# Patient Record
Sex: FEMALE | Race: WHITE | Employment: OTHER | URBAN - METROPOLITAN AREA
[De-identification: names, ages, dates, MRNs, and addresses within clinical notes are randomized per-mention and may not be internally consistent; named-entity substitution may affect disease eponyms.]

---

## 2017-06-21 ENCOUNTER — TELEPHONE (OUTPATIENT)
Dept: OBGYN CLINIC | Age: 72
End: 2017-06-21

## 2017-06-21 NOTE — TELEPHONE ENCOUNTER
Call received at 10:49am      73year old patient last seen in the office on 6/13/16 and has appointment on 7/7/17 for AE and mammogram. Patient calling about getting her bone density results from 5/18/15 to be sent to her PCP. This nurse advised patient to contact her PCP and have them sent a fax request to our office and have it faxed to our office at . Patient verbalized understanding.

## 2017-06-27 ENCOUNTER — HOSPITAL ENCOUNTER (OUTPATIENT)
Dept: ULTRASOUND IMAGING | Age: 72
Discharge: HOME | End: 2017-06-27
Attending: FAMILY MEDICINE
Payer: MEDICARE

## 2017-06-27 DIAGNOSIS — K76.89 LIVER CYST: ICD-10-CM

## 2017-06-27 PROCEDURE — 76700 US EXAM ABDOM COMPLETE: CPT

## 2017-09-01 ENCOUNTER — OFFICE VISIT (OUTPATIENT)
Dept: OBGYN CLINIC | Age: 72
End: 2017-09-01

## 2017-09-01 VITALS
SYSTOLIC BLOOD PRESSURE: 110 MMHG | WEIGHT: 121 LBS | DIASTOLIC BLOOD PRESSURE: 72 MMHG | HEIGHT: 62 IN | BODY MASS INDEX: 22.26 KG/M2

## 2017-09-01 DIAGNOSIS — Z01.419 ENCOUNTER FOR GYNECOLOGICAL EXAMINATION WITHOUT ABNORMAL FINDING: Primary | ICD-10-CM

## 2017-09-01 NOTE — PROGRESS NOTES
Annual exam ages 69+    Dalton is a ,  81 y.o. female [facility] No LMP recorded. Patient is postmenopausal.  She presents for her annual checkup. She is having no significant problems. With regard to the Gardasil vaccine, she is older than the age for which it is FDA approved. Menstrual status:    Her periods are nonexistent in flow due to being postmenopausal.     She denies dysmenorrhea. Hormonal status:  She reports no perimenstrual type symptoms. She is not having vasomotor symptoms. The patient is not using any ERT. Sexual history:    She  reports that she does not currently engage in sexual activity but has had male partners.; She reports using the following method of birth control/protection: None. Medical conditions:    Since her last annual GYN exam about one year ago, she has not the following changes in her health history: none. Pap and Mammogram History:    Her most recent Pap smear was normal obtained 2 year(s) ago. The patient had her mammogram today in our office. Breast Cancer History/Substance Abuse: positive breast cancer in 2 maternal aunts    Osteoporosis History:    Family history does not include a first or second degree relative with osteopenia or osteoporosis. A bone density scan was obtained 2 years ago and revealed Osteopenia of the hip. She is currently taking calcium and vit D. Past Medical History:   Diagnosis Date    Atrophy of vagina     Atrophy of vulva     Common migraine     Encounter for IUD removal 04    Copper 7 Removed    Family history of skin cancer     Fatigue 2012    Headache     Hx of bone density study 07/20/15    Osteopenia of the hip with normal spine.     Hx-TIA (transient ischemic attack)     Hypercholesterolemia     Menopausal syndrome     Osteopenia     Seizure disorder (HCC)     Skin cancer     Skipped beats 2012    Sun-damaged skin      Past Surgical History: Procedure Laterality Date    COLONOSCOPY N/A 6/24/2016    COLONOSCOPY performed by Ernst Grady MD at [facility] HX CATARACT REMOVAL Right     right eye    HX CYST REMOVAL      HX HERNIA REPAIR      \"born with it\"    HX ORTHOPAEDIC Left     Leg Surgery--left leg fracture    NEUROLOGICAL PROCEDURE UNLISTED      cyst on spine removed       Current Outpatient Prescriptions   Medication Sig Dispense Refill    cholecalciferol, VITAMIN D3, (VITAMIN D3) 5,000 unit tab tablet Take  by mouth daily.  folic acid (FOLVITE) 1 mg tablet       DOCOSAHEXANOIC ACID/EPA (FISH OIL PO) Take  by mouth.  MULTIVITAMIN PO Take  by mouth.  topiramate (TOPAMAX) 50 mg tablet Take 1 Tab by mouth two (2) times a day. Brand Name Medically Necessary 180 Tab 3    PRASTERONE, DHEA, (DHEA PO) Take 5 mg by mouth.  ergocalciferol (DRISDOL) 50,000 unit capsule 50,000 units vit d twice a week for 6 weeks. 12 Cap 0     Allergies: Bactrim [sulfamethoprim] and Sulfa (sulfonamide antibiotics)     Tobacco History:  reports that she has never smoked. She has never used smokeless tobacco.  Alcohol Abuse:  reports that she does not drink alcohol. Drug Abuse:  reports that she does not use illicit drugs.     Family Medical/Cancer History:   Family History   Problem Relation Age of Onset    Heart Disease Mother     Cancer Mother      Lung    Breast Cancer Maternal Aunt      x2    Heart Failure Father 64        Review of Systems - History obtained from the patient  Constitutional: negative for weight loss, fever, night sweats  HEENT: negative for hearing loss, earache, congestion, snoring, sorethroat  CV: negative for chest pain, palpitations, edema  Resp: negative for cough, shortness of breath, wheezing  GI: negative for change in bowel habits, abdominal pain, black or bloody stools  : negative for frequency, dysuria, hematuria, vaginal discharge  MSK: negative for back pain, joint pain, muscle pain  Breast: negative for breast lumps, nipple discharge, galactorrhea  Skin :negative for itching, rash, hives  Neuro: negative for dizziness, headache, confusion, weakness  Psych: negative for anxiety, depression, change in mood  Heme/lymph: negative for bleeding, bruising, pallor    Physical Exam    Visit Vitals    /72    Ht 5' 2\" (1.575 m)    Wt 121 lb (54.9 kg)    BMI 22.13 kg/m2       Constitutional  · Appearance: well-nourished, well developed, alert, in no acute distress    HENT  · Head and Face: appears normal    Neck  · Inspection/Palpation: normal appearance, no masses or tenderness  · Lymph Nodes: no lymphadenopathy present  · Thyroid: gland size normal, nontender, no nodules or masses present on palpation    Chest  · Respiratory Effort: breathing unlabored  · Auscultation: normal breath sounds    Cardiovascular  · Heart:  · Auscultation: regular rate and rhythm without murmur    Breasts  · Inspection of Breasts: breasts symmetrical, no skin changes, no discharge present, nipple appearance normal, no skin retraction present  · Palpation of Breasts and Axillae: no masses present on palpation, no breast tenderness  · Axillary Lymph Nodes: no lymphadenopathy present    Gastrointestinal  · Abdominal Examination: abdomen non-tender to palpation, normal bowel sounds, no masses present  · Liver and spleen: no hepatomegaly present, spleen not palpable  · Hernias: no hernias identified    Genitourinary  · External Genitalia: normal appearance for age, no discharge present, no tenderness present, no inflammatory lesions present, no masses present, atrophy present  · Vagina: atrophic but otherwise normal vaginal vault without central or paravaginal defects, no discharge present, no inflammatory lesions present, no masses present  · Bladder: non-tender to palpation  · Urethra: appears normal  · Cervix: normal   · Uterus: normal size, shape and consistency  · Adnexa: no adnexal tenderness present, no adnexal masses present  · Perineum: perineum within normal limits, no evidence of trauma, no rashes or skin lesions present  · Anus: anus within normal limits, no hemorrhoids present  · Inguinal Lymph Nodes: no lymphadenopathy present    Skin  · General Inspection: no rash, no lesions identified    Neurologic/Psychiatric  · Mental Status:  · Orientation: grossly oriented to person, place and time  · Mood and Affect: mood normal, affect appropriate    Assessment:  Routine gynecologic examination  Her current medical status is satisfactory with no evidence of significant gynecologic issues.     Plan:  Counseled re: diet, exercise, healthy lifestyle  Return for yearly wellness visits  Rec annual mammogram

## 2021-03-01 ENCOUNTER — TRANSCRIBE ORDER (OUTPATIENT)
Dept: SCHEDULING | Age: 76
End: 2021-03-01

## 2021-03-01 DIAGNOSIS — M54.2 CERVICALGIA: Primary | ICD-10-CM

## 2021-03-01 DIAGNOSIS — M48.02 SPINAL STENOSIS IN CERVICAL REGION: ICD-10-CM

## 2021-04-12 ENCOUNTER — HOSPITAL ENCOUNTER (OUTPATIENT)
Dept: MRI IMAGING | Age: 76
Discharge: HOME | End: 2021-04-12
Attending: FAMILY MEDICINE
Payer: MEDICARE

## 2021-04-12 DIAGNOSIS — M54.2 CERVICALGIA: ICD-10-CM

## 2021-04-12 PROCEDURE — 72141 MRI NECK SPINE W/O DYE: CPT

## 2021-09-27 ENCOUNTER — TRANSCRIBE ORDER (OUTPATIENT)
Dept: SCHEDULING | Age: 76
End: 2021-09-27

## 2021-09-27 DIAGNOSIS — K76.89 LIVER CYST: Primary | ICD-10-CM

## 2021-09-27 DIAGNOSIS — M48.02 CERVICAL SPINAL STENOSIS: ICD-10-CM

## 2021-09-27 DIAGNOSIS — M50.20 DISPLACEMENT OF CERVICAL INTERVERTEBRAL DISC: ICD-10-CM

## 2021-11-26 ENCOUNTER — HOSPITAL ENCOUNTER (OUTPATIENT)
Dept: MRI IMAGING | Age: 76
Discharge: HOME | End: 2021-11-26
Attending: FAMILY MEDICINE
Payer: MEDICARE

## 2021-11-26 ENCOUNTER — HOSPITAL ENCOUNTER (OUTPATIENT)
Dept: ULTRASOUND IMAGING | Age: 76
Discharge: HOME | End: 2021-11-26
Attending: FAMILY MEDICINE
Payer: MEDICARE

## 2021-11-26 DIAGNOSIS — K76.89 LIVER CYST: ICD-10-CM

## 2021-11-26 DIAGNOSIS — M50.20 DISPLACEMENT OF CERVICAL INTERVERTEBRAL DISC: ICD-10-CM

## 2021-11-26 DIAGNOSIS — M48.02 CERVICAL SPINAL STENOSIS: ICD-10-CM

## 2021-11-26 PROCEDURE — 72141 MRI NECK SPINE W/O DYE: CPT

## 2021-11-26 PROCEDURE — 76700 US EXAM ABDOM COMPLETE: CPT

## 2022-10-12 ENCOUNTER — TRANSCRIBE ORDER (OUTPATIENT)
Dept: SCHEDULING | Age: 77
End: 2022-10-12

## 2022-10-12 DIAGNOSIS — M50.20 DISPLACEMENT OF INTERVERTEBRAL DISC OF CERVICAL REGION: ICD-10-CM

## 2022-10-12 DIAGNOSIS — M19.90 DEGENERATIVE ARTHRITIS: ICD-10-CM

## 2022-10-12 DIAGNOSIS — M75.101 ROTATOR CUFF SYNDROME OF RIGHT SHOULDER: ICD-10-CM

## 2022-10-12 DIAGNOSIS — M48.02 CERVICAL SPINAL STENOSIS: Primary | ICD-10-CM

## 2022-10-13 ENCOUNTER — TRANSCRIBE ORDER (OUTPATIENT)
Dept: SCHEDULING | Age: 77
End: 2022-10-13

## 2022-10-13 DIAGNOSIS — M19.90 DEGENERATIVE ARTHRITIS: ICD-10-CM

## 2022-10-13 DIAGNOSIS — M50.20 DISPLACEMENT OF CERVICAL INTERVERTEBRAL DISC: ICD-10-CM

## 2022-10-13 DIAGNOSIS — M75.101 ROTATOR CUFF SYNDROME, RIGHT: ICD-10-CM

## 2022-10-13 DIAGNOSIS — M75.101 ROTATOR CUFF SYNDROME OF RIGHT SHOULDER: ICD-10-CM

## 2022-10-13 DIAGNOSIS — M48.02 CERVICAL SPINAL STENOSIS: Primary | ICD-10-CM

## 2023-02-20 RX ORDER — FLUMAZENIL 0.1 MG/ML
0.2 INJECTION INTRAVENOUS
Status: CANCELLED | OUTPATIENT
Start: 2023-02-20 | End: 2023-02-21

## 2023-02-20 RX ORDER — NALOXONE HYDROCHLORIDE 0.4 MG/ML
0.4 INJECTION, SOLUTION INTRAMUSCULAR; INTRAVENOUS; SUBCUTANEOUS
Status: CANCELLED | OUTPATIENT
Start: 2023-02-20 | End: 2023-02-21

## 2023-02-20 RX ORDER — EPINEPHRINE 0.1 MG/ML
1 INJECTION INTRACARDIAC; INTRAVENOUS
Status: CANCELLED | OUTPATIENT
Start: 2023-02-20 | End: 2023-02-21

## 2023-02-20 RX ORDER — DEXTROMETHORPHAN/PSEUDOEPHED 2.5-7.5/.8
1.2 DROPS ORAL
Status: CANCELLED | OUTPATIENT
Start: 2023-02-20

## 2023-02-20 RX ORDER — SODIUM CHLORIDE 9 MG/ML
50 INJECTION, SOLUTION INTRAVENOUS CONTINUOUS
Status: CANCELLED | OUTPATIENT
Start: 2023-02-20 | End: 2023-02-21

## 2023-02-20 RX ORDER — MIDAZOLAM HYDROCHLORIDE 1 MG/ML
.25-5 INJECTION, SOLUTION INTRAMUSCULAR; INTRAVENOUS
Status: CANCELLED | OUTPATIENT
Start: 2023-02-20

## 2023-02-20 RX ORDER — ATROPINE SULFATE 0.1 MG/ML
0.4 INJECTION INTRAVENOUS
Status: CANCELLED | OUTPATIENT
Start: 2023-02-20 | End: 2023-02-21

## 2023-02-21 RX ORDER — GUAIFENESIN 100 MG/5ML
81 LIQUID (ML) ORAL DAILY
COMMUNITY

## 2023-02-21 NOTE — PERIOP NOTES
[address] [place] [address] Dr Winston Preprocedure Instructions      1. On the day of your surgery, please report to registration located on the 2nd floor of the  [facility]. yes    2. You must have a responsible adult to drive you to the hospital, stay at the hospital during your procedure and drive you home. If they leave your procedure will not be started (no exceptions). yes    3. Do not have anything to eat or drink (including water, gum, mints, coffee, and juice) after midnight. This does not apply to the medications you were instructed to take by your physician. yes  If you are currently taking Plavix, Coumadin, Aspirin, or other blood-thinning agents, contact your physician for special instructions. yes, aspirin. 4. If you are having a procedure that requires bowel prep: We recommend that you have only clear liquids the day before your procedure and begin your bowel prep by 5:00 pm.  You may continue to drink clear liquids until midnight. If for any reason you are not able to complete your prep please notify your physician immediately. yes    5. Have a list of all current medications, including vitamins, herbal supplements and any other over the counter medications. yes    6. If you wear glasses, contacts, dentures and/or hearing aids, they may be removed prior to procedure, please bring a case to store them in. yes    7. You should understand that if you do not follow these instructions your procedure may be cancelled. If your physical condition changes (I.e. fever, cold or flu) please contact your doctor as soon as possible. 8. It is important that you be on time.   If for any reason you are unable to keep your appointment please call [phone]  the day of or your physicians office prior to your scheduled procedure

## 2023-02-24 ENCOUNTER — ANESTHESIA (OUTPATIENT)
Dept: ENDOSCOPY | Age: 78
End: 2023-02-24
Payer: MEDICARE

## 2023-02-24 ENCOUNTER — HOSPITAL ENCOUNTER (OUTPATIENT)
Age: 78
Setting detail: OUTPATIENT SURGERY
Discharge: HOME | End: 2023-02-24
Attending: INTERNAL MEDICINE | Admitting: INTERNAL MEDICINE
Payer: MEDICARE

## 2023-02-24 ENCOUNTER — ANESTHESIA EVENT (OUTPATIENT)
Dept: ENDOSCOPY | Age: 78
End: 2023-02-24
Payer: MEDICARE

## 2023-02-24 VITALS
OXYGEN SATURATION: 98 % | HEART RATE: 77 BPM | BODY MASS INDEX: 27.55 KG/M2 | RESPIRATION RATE: 16 BRPM | WEIGHT: 149.69 LBS | SYSTOLIC BLOOD PRESSURE: 123 MMHG | HEIGHT: 62 IN | DIASTOLIC BLOOD PRESSURE: 69 MMHG | TEMPERATURE: 97.7 F

## 2023-02-24 PROCEDURE — 74011250636 HC RX REV CODE- 250/636: Performed by: NURSE ANESTHETIST, CERTIFIED REGISTERED

## 2023-02-24 PROCEDURE — 76040000019: Performed by: INTERNAL MEDICINE

## 2023-02-24 PROCEDURE — 77030021593 HC FCPS BIOP ENDOSC BSC -A: Performed by: INTERNAL MEDICINE

## 2023-02-24 PROCEDURE — 76060000031 HC ANESTHESIA FIRST 0.5 HR: Performed by: INTERNAL MEDICINE

## 2023-02-24 PROCEDURE — 88305 TISSUE EXAM BY PATHOLOGIST: CPT

## 2023-02-24 PROCEDURE — 74011000250 HC RX REV CODE- 250: Performed by: NURSE ANESTHETIST, CERTIFIED REGISTERED

## 2023-02-24 PROCEDURE — 2709999900 HC NON-CHARGEABLE SUPPLY: Performed by: INTERNAL MEDICINE

## 2023-02-24 RX ORDER — PROPOFOL 10 MG/ML
INJECTION, EMULSION INTRAVENOUS
Status: DISCONTINUED | OUTPATIENT
Start: 2023-02-24 | End: 2023-02-24 | Stop reason: HOSPADM

## 2023-02-24 RX ORDER — PROPOFOL 10 MG/ML
INJECTION, EMULSION INTRAVENOUS AS NEEDED
Status: DISCONTINUED | OUTPATIENT
Start: 2023-02-24 | End: 2023-02-24 | Stop reason: HOSPADM

## 2023-02-24 RX ORDER — LIDOCAINE HYDROCHLORIDE 20 MG/ML
INJECTION, SOLUTION EPIDURAL; INFILTRATION; INTRACAUDAL; PERINEURAL AS NEEDED
Status: DISCONTINUED | OUTPATIENT
Start: 2023-02-24 | End: 2023-02-24 | Stop reason: HOSPADM

## 2023-02-24 RX ORDER — SODIUM CHLORIDE 9 MG/ML
INJECTION, SOLUTION INTRAVENOUS
Status: DISCONTINUED | OUTPATIENT
Start: 2023-02-24 | End: 2023-02-24 | Stop reason: HOSPADM

## 2023-02-24 RX ADMIN — PROPOFOL 50 MG: 10 INJECTION, EMULSION INTRAVENOUS at 08:48

## 2023-02-24 RX ADMIN — SODIUM CHLORIDE: 9 INJECTION, SOLUTION INTRAVENOUS at 08:46

## 2023-02-24 RX ADMIN — PROPOFOL 20 MG: 10 INJECTION, EMULSION INTRAVENOUS at 08:54

## 2023-02-24 RX ADMIN — PROPOFOL 120 MCG/KG/MIN: 10 INJECTION, EMULSION INTRAVENOUS at 08:48

## 2023-02-24 RX ADMIN — LIDOCAINE HYDROCHLORIDE 20 MG: 20 INJECTION, SOLUTION EPIDURAL; INFILTRATION; INTRACAUDAL; PERINEURAL at 08:47

## 2023-02-24 NOTE — H&P
Niraj Means M.D.  [phone]            History and Physical       NAME:  Kimber Stock   :   1945   MRN:   [id number]       Referring Physician:  Dr. Jayde Redman Date: 2023 8:44 AM    Chief Complaint:  Colon cancer screening    History of Present Illness:  Patient is a 78 y.o. who is seen for colon cancer screening. Denies any ongoing GI complaints. She has family history of colon cancer and personal history of colon polyps. PMH:  Past Medical History:   Diagnosis Date    Arrhythmia     patient states has skipped beats. Atrophy of vagina     Atrophy of vulva     Common migraine     Family history of skin cancer     Fatigue 2012    Headache(784.0)     Hx of bone density study 2015    Osteopenia of the hip with normal spine. Hx-TIA (transient ischemic attack)     Hypercholesterolemia     Menopausal syndrome     Osteopenia 2015    Seizure disorder ([address]     last . Skin cancer     Skipped beats 2012    Stroke ([address]     Sun-damaged skin        PSH:  Past Surgical History:   Procedure Laterality Date    COLONOSCOPY N/A 2016    COLONOSCOPY performed by Reynold Phalen, MD at [facility] ENDOSCOPY    HX CATARACT REMOVAL Right     right eye    HX CYST REMOVAL      HX HERNIA REPAIR      \"born with it\"    HX ORTHOPAEDIC Left     Leg Surgery--left leg fracture    UT UNLISTED NEUROLOGICAL/NEUROMUSCULAR DX PX      cyst on spine removed       Allergies: Allergies   Allergen Reactions    Bactrim [Sulfamethoprim] Unknown (comments)    Sulfa (Sulfonamide Antibiotics) Hives       Home Medications:  Prior to Admission Medications   Prescriptions Last Dose Informant Patient Reported? Taking? DOCOSAHEXANOIC ACID/EPA (FISH OIL PO) 2023  Yes Yes   Sig: Take  by mouth. MULTIVITAMIN PO 2023  Yes Yes   Sig: Take  by mouth. PRASTERONE, DHEA, (DHEA PO) 2023  Yes Yes   Sig: Take 5 mg by mouth.    aspirin 81 mg chewable tablet 2/10/2023 Yes Yes   Sig: Take 81 mg by mouth daily. cholecalciferol, VITAMIN D3, (VITAMIN D3) 5,000 unit tab tablet 2023  Yes Yes   Sig: Take  by mouth daily. ergocalciferol (DRISDOL) 50,000 unit capsule 2023  No Yes   Si,000 units vit d twice a week for 6 weeks. folic acid (FOLVITE) 1 mg tablet 2023  Yes Yes   topiramate (TOPAMAX) 50 mg tablet 2023  No Yes   Sig: Take 1 Tab by mouth two (2) times a day. Brand Name Medically Necessary      Facility-Administered Medications: None       Hospital Medications:  No current facility-administered medications for this encounter. Social History:  Social History     Tobacco Use    Smoking status: Never    Smokeless tobacco: Never   Substance Use Topics    Alcohol use: No     Comment: \"used to, don't anymore\"       Family History:  Family History   Problem Relation Age of Onset    Heart Disease Mother     Lung Cancer Mother     Breast Cancer Maternal Aunt         x2    Heart Failure Father 64             Review of Systems:      Constitutional: negative fever, negative chills, negative weight loss  Eyes:   negative visual changes  ENT:   negative sore throat, tongue or lip swelling  Respiratory:  negative cough, negative dyspnea  Cards:  negative for chest pain, palpitations, lower extremity edema  GI:   See HPI  :  negative for frequency, dysuria  Integument:  negative for rash and pruritus  Heme:  negative for easy bruising and gum/nose bleeding  Musculoskel: negative for myalgias,  back pain and muscle weakness  Neuro: negative for headaches, dizziness, vertigo  Psych:  negative for feelings of anxiety, depression       Objective:   Patient Vitals for the past 8 hrs:   BP Temp Pulse Resp SpO2 Height Weight   23 0824 104/65 98.1 °F (36.7 °C) 86 14 95 % 5' 2\" (1.575 m) 67.9 kg (149 lb 11.1 oz)     No intake/output data recorded. No intake/output data recorded.     EXAM:     NEURO-a&o   HEENT-wnl   LUNGS-clear    COR-regular rate and rhythym     ABD-soft , no tenderness, no rebound, good bs     EXT-no edema     Data Review     No results for input(s): WBC, HGB, HCT, PLT, HGBEXT, HCTEXT, PLTEXT in the last 72 hours. No results for input(s): NA, K, CL, CO2, BUN, CREA, GLU, PHOS, CA in the last 72 hours. No results for input(s): AP, TBIL, TP, ALB, GLOB, GGT, AML, LPSE in the last 72 hours. No lab exists for component: SGOT, GPT, AMYP, HLPSE  No results for input(s): INR, PTP, APTT, INREXT in the last 72 hours. Patient Active Problem List   Diagnosis Code    Fatigue R53.83    Skipped beats I45.9    Delusion (HCC) F22    Atrophy of vulva N90.5    Atrophy of vagina N95.2    Menopausal syndrome N95.1      Assessment:     Colon cancer screening   Plan:     Colonoscopy today.      Signed By: Miladis Villasenor MD     2/24/2023  8:44 AM

## 2023-02-24 NOTE — PERIOP NOTES
Report from [address] see anesthesia record. Abdomen remains soft, non-tender; pt denies pain. Scope pre-cleaned at bedside by [place]. Report given to [facility] in Recovery.

## 2023-02-24 NOTE — ANESTHESIA POSTPROCEDURE EVALUATION
Procedure(s):  COLONOSCOPY  ENDOSCOPIC POLYPECTOMY. MAC    Anesthesia Post Evaluation        Patient location during evaluation: PACU  Level of consciousness: awake and alert  Pain score: 0  Airway patency: patent  Anesthetic complications: no  Cardiovascular status: acceptable  Respiratory status: acceptable  Hydration status: acceptable  Post anesthesia nausea and vomiting:  none  Final Post Anesthesia Temperature Assessment:  Normothermia (36.0-37.5 degrees C)      INITIAL Post-op Vital signs:   Vitals Value Taken Time   /83 02/24/23 0928   Temp 36.5 °C (97.7 °F) 02/24/23 0919   Pulse 78 02/24/23 0929   Resp 17 02/24/23 0929   SpO2 99 % 02/24/23 0929   Vitals shown include unvalidated device data.

## 2023-02-24 NOTE — PROGRESS NOTES
Endoscopy discharge instructions have been reviewed and given to patient. The patient verbalized understanding and acceptance of instructions. Dr. Moiz Napoles discussed with patient procedure findings and next steps. Dentures in patient.  mouth

## 2023-02-24 NOTE — PROGRESS NOTES
[facility]  1945  [id number]    Situation:  Verbal report received from: MIRIAM Powers  Procedure: Procedure(s):  COLONOSCOPY  ENDOSCOPIC POLYPECTOMY    Background:    Preoperative diagnosis: FAMILY HISTORY COLON CANCER  Postoperative diagnosis: 1. polypectomy    :  Dr. Jodi Acosta  Assistant(s): Endoscopy Technician-1: Brittny Cummings  Endoscopy RN-1: Fernando Dugan RN    Specimens:   ID Type Source Tests Collected by Time Destination   1 : ascending polyp Preservative   Patty Harrison MD 2/24/2023 0512 Pathology     H. Pylori  no    Assessment:  Intra-procedure medications     Anesthesia gave intra-procedure sedation and medications, see anesthesia flow sheet yes    Intravenous fluids: NS@ KVO     Vital signs stable yes      Abdominal assessment: round and soft yes    Recommendation:  Discharge patient per MD order yes.   Return to floor na  Family or Friend family  Permission to share finding with family or friend yes Patient Education - Other

## 2023-02-24 NOTE — ANESTHESIA PREPROCEDURE EVALUATION
Relevant Problems   No relevant active problems       Anesthetic History   No history of anesthetic complications            Review of Systems / Medical History  Patient summary reviewed and pertinent labs reviewed    Pulmonary  Within defined limits                 Neuro/Psych     seizures: well controlled  CVA  TIA     Cardiovascular            Dysrhythmias   Hyperlipidemia    Exercise tolerance: >4 METS     GI/Hepatic/Renal  Within defined limits              Endo/Other  Within defined limits           Other Findings            Physical Exam    Airway  Mallampati: III  TM Distance: 4 - 6 cm  Neck ROM: normal range of motion        Cardiovascular  Regular rate and rhythm,  S1 and S2 normal,  no murmur, click, rub, or gallop             Dental    Dentition: Full lower dentures and Full upper dentures     Pulmonary  Breath sounds clear to auscultation               Abdominal         Other Findings            Anesthetic Plan    ASA: 3  Anesthesia type: MAC            Anesthetic plan and risks discussed with: Patient

## 2023-02-24 NOTE — PROCEDURES
Antonio Montelongo M.D.  [phone]            2023          Colonoscopy Operative Report  Yesy Jaramillo  :  1945  [facility] Medical Record Number:  [id number]      Indications:    Family history of coloretal cancer (screening only), Personal history of colon polyps (screening only)     :  Mau Akhtar MD    Referring Provider: Madhu Crooks MD    Sedation:  MAC anesthesia    Pre-Procedural Exam:      Airway: clear,  No airway problems anticipated  Heart: RRR, without gallops or rubs  Lungs: clear bilaterally without wheezes, crackles, or rhonchi  Abdomen: soft, nontender, nondistended, bowel sounds present  Mental Status: awake, alert and oriented to person, place and time     Procedure Details:  After informed consent was obtained with all risks and benefits of procedure explained and preoperative exam completed, the patient was taken to the endoscopy suite and placed in the left lateral decubitus position. Upon sequential sedation as per above, a digital rectal exam was performed. The Olympus videocolonoscope  was inserted in the rectum and carefully advanced to the cecum, which was identified by the ileocecal valve and appendiceal orifice. The quality of preparation was good. The colonoscope was slowly withdrawn with careful inspection and evaluation between folds. Retroflexion in the rectum was performed. Findings:   Terminal Ileum: not intubated  Cecum: normal  Ascending Colon: 1  Sessile polyp(s), the largest 3 mm in size;  Transverse Colon: normal  Descending Colon: normal  Sigmoid: normal  Rectum: no mucosal lesion appreciated  Grade 1 internal hemorrhoid(s); Interventions:  1 complete polypectomy were performed using cold biopsy forceps and the polyps were  retrieved    Specimen Removed:  specimen #1, 3 mm in size, located in the ascending colon removed by cold biopsy and sent for pathology    Complications: None.      EBL: None.    Impression:  One polyp removed and sent to pathology, otherwise mucosa within normal    Recommendations:  -Repeat colonoscopy in 5 years will be based on health status and if willing to undergo colonoscopy then.   -High fiber diet.    -Resume normal medication(s). Discharge Disposition:  Home in the company of a  when able to ambulate.     Josie Parker MD  2/24/2023  9:13 AM

## 2023-02-24 NOTE — DISCHARGE INSTRUCTIONS
[facility]. Umberto Bhatt M.D.  [phone]            COLON DISCHARGE INSTRUCTIONS       2023    Iris Camarillo  :  1945  [facility] Medical Record Number:  [id number]      COLONOSCOPY FINDINGS:  Your colonoscopy showed one diminutive polyp that was removed and sent to pathology, otherwise looked within normal.    DISCOMFORT:  Redness at IV site- apply warm compress to area; if redness or soreness persist- contact your physician  There may be a slight amount of blood passed from the rectum  Gaseous discomfort- walking, belching will help relieve any discomfort  You may not operate a vehicle for 12 hours  You may not engage in an occupation involving machinery or appliances for rest of today  You may not drink alcoholic beverages for at least 12 hours  Avoid making any critical decisions for at least 24 hour  DIET:   High fiber diet. - however -  remember your colon is empty and a heavy meal will produce gas. Avoid these foods:  vegetables, fried / greasy foods, carbonated drinks for today     ACTIVITY:  You may resume your normal daily activities it is recommended that you spend the remainder of the day resting -  avoid any strenuous activity. CALL M.D. ANY SIGN OF:   Increasing pain, nausea, vomiting  Abdominal distension (swelling)  New increased bleeding (oral or rectal)  Fever (chills)  Pain in chest area  Bloody discharge from nose or mouth   Shortness of breath    Follow-up Instructions:   Call Dr. Blanka Soto if any questions or problems. Telephone # [phone]  Biopsy results will be available in  5 to 7 days  Repeat colonoscopy in 5 years will be based on health status and if willing to undergo colonoscopy then.

## (undated) DEVICE — KIT COLON DUAL END BRSH W/LUBE -- CUSTOM BX/20 FORMERLY KS-18-20

## (undated) DEVICE — 3M™ CUROS™ DISINFECTING CAP FOR NEEDLELESS CONNECTORS 270/CARTON 20 CARTONS/CASE CFF1-270: Brand: CUROS™

## (undated) DEVICE — CONTAINER SPEC 20 ML LID NEUT BUFF FORMALIN 10 % POLYPR STS

## (undated) DEVICE — CANN NASAL O2 CAPNOGRAPHY AD -- FILTERLINE

## (undated) DEVICE — Device

## (undated) DEVICE — ELECTRODE,RADIOTRANSLUCENT,FOAM,3PK: Brand: MEDLINE

## (undated) DEVICE — 1200 GUARD II KIT W/5MM TUBE W/O VAC TUBE: Brand: GUARDIAN

## (undated) DEVICE — SET IV ADMIN GRAVITY W/STPCOCK --

## (undated) DEVICE — BAG SPEC BIOHZRD 10 X 10 IN --

## (undated) DEVICE — FCPS RAD JAW 4LC 240CM W/NDL -- BX/40

## (undated) DEVICE — BAG BELONG PT PERS CLEAR HANDL

## (undated) DEVICE — SOLIDIFIER MEDC 1200ML -- CONVERT TO 356117

## (undated) DEVICE — SIMPLICITY FLUFF UNDERPAD 23X36, MODERATE: Brand: SIMPLICITY

## (undated) DEVICE — CATH IV AUTOGRD BC PNK 20GA 25 -- INSYTE

## (undated) DEVICE — (D)CUP DENT 7.5OZ PLAS DSTY -- DISC BY MFR USE ITEM 341725